# Patient Record
Sex: OTHER/UNKNOWN | Race: WHITE | URBAN - METROPOLITAN AREA
[De-identification: names, ages, dates, MRNs, and addresses within clinical notes are randomized per-mention and may not be internally consistent; named-entity substitution may affect disease eponyms.]

---

## 2017-01-25 ENCOUNTER — APPOINTMENT (OUTPATIENT)
Dept: URBAN - METROPOLITAN AREA CLINIC 290 | Age: 82
Setting detail: DERMATOLOGY
End: 2017-01-26

## 2017-01-25 DIAGNOSIS — Z12.83 ENCOUNTER FOR SCREENING FOR MALIGNANT NEOPLASM OF SKIN: ICD-10-CM

## 2017-01-25 DIAGNOSIS — L21.8 OTHER SEBORRHEIC DERMATITIS: ICD-10-CM

## 2017-01-25 DIAGNOSIS — L57.0 ACTINIC KERATOSIS: ICD-10-CM

## 2017-01-25 DIAGNOSIS — L81.4 OTHER MELANIN HYPERPIGMENTATION: ICD-10-CM

## 2017-01-25 PROBLEM — D48.5 NEOPLASM OF UNCERTAIN BEHAVIOR OF SKIN: Status: ACTIVE | Noted: 2017-01-25

## 2017-01-25 PROCEDURE — OTHER OBSERVATION: OTHER

## 2017-01-25 PROCEDURE — OTHER MIPS QUALITY: OTHER

## 2017-01-25 PROCEDURE — 99203 OFFICE O/P NEW LOW 30 MIN: CPT | Mod: 25

## 2017-01-25 PROCEDURE — OTHER DEFER: OTHER

## 2017-01-25 PROCEDURE — OTHER PRESCRIPTION: OTHER

## 2017-01-25 PROCEDURE — OTHER LIQUID NITROGEN: OTHER

## 2017-01-25 PROCEDURE — 17000 DESTRUCT PREMALG LESION: CPT

## 2017-01-25 PROCEDURE — OTHER COUNSELING: OTHER

## 2017-01-25 PROCEDURE — 17003 DESTRUCT PREMALG LES 2-14: CPT

## 2017-01-25 PROCEDURE — 11100: CPT | Mod: 59

## 2017-01-25 PROCEDURE — 69100 BIOPSY OF EXTERNAL EAR: CPT | Mod: 59

## 2017-01-25 PROCEDURE — OTHER BIOPSY BY SHAVE METHOD: OTHER

## 2017-01-25 RX ORDER — KETOCONAZOLE 20.5 MG/ML
SHAMPOO, SUSPENSION TOPICAL
Qty: 120 | Refills: 0 | Status: ERX | COMMUNITY
Start: 2017-01-25

## 2017-01-25 ASSESSMENT — LOCATION ZONE DERM
LOCATION ZONE: ARM
LOCATION ZONE: SCALP
LOCATION ZONE: FACE

## 2017-01-25 ASSESSMENT — LOCATION SIMPLE DESCRIPTION DERM
LOCATION SIMPLE: LEFT FOREARM
LOCATION SIMPLE: LEFT TEMPLE
LOCATION SIMPLE: LEFT FOREHEAD
LOCATION SIMPLE: RIGHT ZYGOMA
LOCATION SIMPLE: HAIR

## 2017-01-25 ASSESSMENT — LOCATION DETAILED DESCRIPTION DERM
LOCATION DETAILED: LEFT MEDIAL TEMPLE
LOCATION DETAILED: LEFT PROXIMAL DORSAL FOREARM
LOCATION DETAILED: LEFT FOREHEAD
LOCATION DETAILED: LEFT INFERIOR FOREHEAD
LOCATION DETAILED: HAIR
LOCATION DETAILED: RIGHT CENTRAL ZYGOMA

## 2017-01-25 NOTE — PROCEDURE: LIQUID NITROGEN
Post-Care Instructions: Importance of follow up discussed
Consent: Patient warned of the risk of swelling, blistering, and scarring. Importance of follow up discussed\\nVerbal consent obtained
Duration Of Freeze Thaw-Cycle (Seconds): 0
Detail Level: Zone
Render Post-Care Instructions In Note?: yes

## 2017-01-25 NOTE — PROCEDURE: BIOPSY BY SHAVE METHOD
Bill For Surgical Tray: no
Biopsy Method: Personna blade
Anesthesia Volume In Cc (Will Not Render If 0): 0.8
Consent: Verbal consent was obtained. Warned of risk of scaring, bleeding, infection, hypo and hyper pigmentation and possible need for further treatment.
Biopsy Type: H and E
Hemostasis: Pressure
Type Of Destruction Used: Curettage
Electrodesiccation And Curettage Text: The wound bed was treated with electrodesiccation and curettage after the biopsy was performed.
Electrodesiccation Text: The wound bed was treated with electrodesiccation after the biopsy was performed.
Anesthesia Type: 1% lidocaine with 1:500,000 epinephrine and a 1:10 solution of 8.4% sodium bicarbonate
Additional Anesthesia Volume In Cc (Will Not Render If 0): 0
Detail Level: Detailed
Silver Nitrate Text: The wound bed was treated with silver nitrate after the biopsy was performed.
Billing Type: Third-Party Bill
Dressing: waterproof dressing
Post-Care Instructions: Wash, apply Vaseline and a bandage daily until healed \\nImportance of follow up discussed
Cryotherapy Text: The wound bed was treated with cryotherapy after the biopsy was performed.
Render Post-Care Instructions In Note?: yes
Path Notes (To The Dermatopathologist): 7mm\\nCliffel
Wound Care: No ointment
Path Notes (To The Dermatopathologist): 8mm\\nCliffel
Size Of Lesion In Cm: 0.7

## 2017-02-15 ENCOUNTER — APPOINTMENT (OUTPATIENT)
Dept: URBAN - METROPOLITAN AREA CLINIC 290 | Age: 82
Setting detail: DERMATOLOGY
End: 2017-03-02

## 2017-02-15 DIAGNOSIS — L57.0 ACTINIC KERATOSIS: ICD-10-CM

## 2017-02-15 PROBLEM — C44.329 SQUAMOUS CELL CARCINOMA OF SKIN OF OTHER PARTS OF FACE: Status: ACTIVE | Noted: 2017-02-15

## 2017-02-15 PROBLEM — D48.5 NEOPLASM OF UNCERTAIN BEHAVIOR OF SKIN: Status: ACTIVE | Noted: 2017-02-15

## 2017-02-15 PROBLEM — C44.212 BASAL CELL CARCINOMA OF SKIN OF RIGHT EAR AND EXTERNAL AURICULAR CANAL: Status: ACTIVE | Noted: 2017-02-15

## 2017-02-15 PROCEDURE — OTHER COUNSELING: OTHER

## 2017-02-15 PROCEDURE — OTHER PATHOLOGY DISCUSSION: OTHER

## 2017-02-15 PROCEDURE — 17000 DESTRUCT PREMALG LESION: CPT

## 2017-02-15 PROCEDURE — OTHER DEFER: OTHER

## 2017-02-15 PROCEDURE — 11100: CPT | Mod: 59

## 2017-02-15 PROCEDURE — OTHER BIOPSY BY SHAVE METHOD: OTHER

## 2017-02-15 PROCEDURE — 11101: CPT

## 2017-02-15 PROCEDURE — 99213 OFFICE O/P EST LOW 20 MIN: CPT | Mod: 25

## 2017-02-15 PROCEDURE — OTHER LIQUID NITROGEN: OTHER

## 2017-02-15 ASSESSMENT — LOCATION DETAILED DESCRIPTION DERM: LOCATION DETAILED: LEFT PROXIMAL DORSAL FOREARM

## 2017-02-15 ASSESSMENT — LOCATION SIMPLE DESCRIPTION DERM: LOCATION SIMPLE: LEFT FOREARM

## 2017-02-15 ASSESSMENT — LOCATION ZONE DERM: LOCATION ZONE: ARM

## 2017-02-15 NOTE — PROCEDURE: DEFER
Detail Level: Detailed
Instructions (Optional): MOHS scheduled for 4/24/17 @ 7:45 am
Instructions (Optional): MOHS scheduled for 4/17/17 @ 9:00 am

## 2017-02-15 NOTE — PROCEDURE: LIQUID NITROGEN
Duration Of Freeze Thaw-Cycle (Seconds): 0
Render Post-Care Instructions In Note?: yes
Consent: Patient warned of the risk of swelling, blistering, and scarring. Importance of follow up discussed\\nVerbal consent obtained
Post-Care Instructions: Importance of follow up discussed
Detail Level: Zone

## 2017-02-15 NOTE — PROCEDURE: BIOPSY BY SHAVE METHOD
Bill For Surgical Tray: no
Detail Level: Detailed
Electrodesiccation And Curettage Text: The wound bed was treated with electrodesiccation and curettage after the biopsy was performed.
Type Of Destruction Used: Electrodesiccation
Dressing: no dressing applied
Wound Care: Vaseline
Consent: Verbal consent was obtained. Warned of risk of scaring, bleeding, infection, hypo and hyper pigmentation and possible need for further treatment.
Biopsy Method: Personna blade
Billing Type: Third-Party Bill
Hemostasis: Pressure
Post-Care Instructions: Wash, apply Vaseline and a bandage daily until healed \\nImportance of follow up discussed
Biopsy Type: H and E
X Size Of Lesion In Cm: 0
Size Of Lesion In Cm: 1.3
Silver Nitrate Text: The wound bed was treated with silver nitrate after the biopsy was performed.
Anesthesia Type: 1% lidocaine without epinephrine and a 1:10 solution of 8.4% sodium bicarbonate
Anesthesia Volume In Cc (Will Not Render If 0): 1
Render Post-Care Instructions In Note?: yes
Cryotherapy Text: The wound bed was treated with cryotherapy after the biopsy was performed.
Size Of Lesion In Cm: 0.6
Electrodesiccation Text: The wound bed was treated with electrodesiccation after the biopsy was performed.

## 2017-04-17 ENCOUNTER — APPOINTMENT (OUTPATIENT)
Dept: URBAN - METROPOLITAN AREA CLINIC 290 | Age: 82
Setting detail: DERMATOLOGY
End: 2017-04-17

## 2017-04-17 VITALS — SYSTOLIC BLOOD PRESSURE: 190 MMHG | DIASTOLIC BLOOD PRESSURE: 84 MMHG

## 2017-04-17 PROBLEM — C44.399 OTHER SPECIFIED MALIGNANT NEOPLASM OF SKIN OF OTHER PARTS OF FACE: Status: ACTIVE | Noted: 2017-04-17

## 2017-04-17 PROCEDURE — 17312 MOHS ADDL STAGE: CPT

## 2017-04-17 PROCEDURE — 13132 CMPLX RPR F/C/C/M/N/AX/G/H/F: CPT

## 2017-04-17 PROCEDURE — OTHER CONSULTATION FOR MOHS SURGERY: OTHER

## 2017-04-17 PROCEDURE — 17311 MOHS 1 STAGE H/N/HF/G: CPT

## 2017-04-17 PROCEDURE — OTHER MOHS SURGERY: OTHER

## 2017-04-17 NOTE — PROCEDURE: CONSULTATION FOR MOHS SURGERY
Incorporate Mauc In Note: No
Size Of Lesion: -
X Size Of Lesion In Cm (Optional): 0
Detail Level: Detailed

## 2017-04-24 ENCOUNTER — APPOINTMENT (OUTPATIENT)
Dept: URBAN - METROPOLITAN AREA CLINIC 290 | Age: 82
Setting detail: DERMATOLOGY
End: 2017-04-24

## 2017-04-24 VITALS — DIASTOLIC BLOOD PRESSURE: 59 MMHG | SYSTOLIC BLOOD PRESSURE: 171 MMHG

## 2017-04-24 DIAGNOSIS — Z85.828 PERSONAL HISTORY OF OTHER MALIGNANT NEOPLASM OF SKIN: ICD-10-CM

## 2017-04-24 PROBLEM — C44.212 BASAL CELL CARCINOMA OF SKIN OF RIGHT EAR AND EXTERNAL AURICULAR CANAL: Status: ACTIVE | Noted: 2017-04-24

## 2017-04-24 PROCEDURE — 17312 MOHS ADDL STAGE: CPT | Mod: 79

## 2017-04-24 PROCEDURE — OTHER MOHS SURGERY: OTHER

## 2017-04-24 PROCEDURE — 15260 FTH/GFT FR N/E/E/L 20 SQCM/<: CPT | Mod: 79

## 2017-04-24 PROCEDURE — OTHER PRESCRIPTION: OTHER

## 2017-04-24 PROCEDURE — 17311 MOHS 1 STAGE H/N/HF/G: CPT | Mod: 79

## 2017-04-24 PROCEDURE — OTHER CONSULTATION FOR MOHS SURGERY: OTHER

## 2017-04-24 RX ORDER — CEPHALEXIN 500 MG/1
CAPSULE ORAL
Qty: 21 | Refills: 0 | Status: ERX | COMMUNITY
Start: 2017-04-24

## 2017-04-24 NOTE — PROCEDURE: CONSULTATION FOR MOHS SURGERY
X Size Of Lesion In Cm (Optional): 0
Size Of Lesion: -
Incorporate Mauc In Note: No
Detail Level: Detailed

## 2017-04-28 ENCOUNTER — APPOINTMENT (OUTPATIENT)
Dept: URBAN - METROPOLITAN AREA CLINIC 290 | Age: 82
Setting detail: DERMATOLOGY
End: 2017-04-28

## 2017-04-28 DIAGNOSIS — Z48.01 ENCOUNTER FOR CHANGE OR REMOVAL OF SURGICAL WOUND DRESSING: ICD-10-CM

## 2017-04-28 PROCEDURE — OTHER DRESSING CHANGE: OTHER

## 2017-04-28 ASSESSMENT — LOCATION DETAILED DESCRIPTION DERM: LOCATION DETAILED: RIGHT SUPERIOR HELIX

## 2017-04-28 ASSESSMENT — LOCATION ZONE DERM: LOCATION ZONE: EAR

## 2017-04-28 ASSESSMENT — LOCATION SIMPLE DESCRIPTION DERM: LOCATION SIMPLE: RIGHT EAR

## 2017-04-28 NOTE — PROCEDURE: DRESSING CHANGE
Add 59135 Cpt? (Important Note: In 2017 The Use Of 88552 Is Being Tracked By Cms To Determine Future Global Period Reimbursement For Global Periods): no
Detail Level: Detailed

## 2017-05-01 ENCOUNTER — APPOINTMENT (OUTPATIENT)
Dept: URBAN - METROPOLITAN AREA CLINIC 290 | Age: 82
Setting detail: DERMATOLOGY
End: 2017-05-01

## 2017-05-01 DIAGNOSIS — Z48.02 ENCOUNTER FOR REMOVAL OF SUTURES: ICD-10-CM

## 2017-05-01 PROCEDURE — OTHER SUTURE REMOVAL (GLOBAL PERIOD): OTHER

## 2017-05-01 PROCEDURE — 99024 POSTOP FOLLOW-UP VISIT: CPT

## 2017-05-01 ASSESSMENT — LOCATION SIMPLE DESCRIPTION DERM: LOCATION SIMPLE: RIGHT EAR

## 2017-05-01 ASSESSMENT — LOCATION ZONE DERM: LOCATION ZONE: EAR

## 2017-05-01 ASSESSMENT — LOCATION DETAILED DESCRIPTION DERM: LOCATION DETAILED: RIGHT SUPERIOR HELIX

## 2017-05-01 NOTE — PROCEDURE: SUTURE REMOVAL (GLOBAL PERIOD)
Add 48807 Cpt? (Important Note: In 2017 The Use Of 31930 Is Being Tracked By Cms To Determine Future Global Period Reimbursement For Global Periods): yes
Detail Level: Detailed

## 2017-05-01 NOTE — HPI: SUTURE REMOVAL
Additional History: Patient states he tolerated procedure well.  Patient states he has been keeping Vaseline and a bandage on site daily.

## 2017-07-05 ENCOUNTER — APPOINTMENT (OUTPATIENT)
Dept: URBAN - METROPOLITAN AREA CLINIC 290 | Age: 82
Setting detail: DERMATOLOGY
End: 2017-07-05

## 2017-07-05 DIAGNOSIS — Z12.83 ENCOUNTER FOR SCREENING FOR MALIGNANT NEOPLASM OF SKIN: ICD-10-CM

## 2017-07-05 DIAGNOSIS — L57.0 ACTINIC KERATOSIS: ICD-10-CM

## 2017-07-05 DIAGNOSIS — Z85.828 PERSONAL HISTORY OF OTHER MALIGNANT NEOPLASM OF SKIN: ICD-10-CM

## 2017-07-05 PROBLEM — D48.5 NEOPLASM OF UNCERTAIN BEHAVIOR OF SKIN: Status: ACTIVE | Noted: 2017-07-05

## 2017-07-05 PROCEDURE — OTHER DEFER: OTHER

## 2017-07-05 PROCEDURE — OTHER COUNSELING: OTHER

## 2017-07-05 PROCEDURE — 17000 DESTRUCT PREMALG LESION: CPT | Mod: 79

## 2017-07-05 PROCEDURE — OTHER LIQUID NITROGEN: OTHER

## 2017-07-05 PROCEDURE — 11100: CPT | Mod: 59,79

## 2017-07-05 PROCEDURE — 17003 DESTRUCT PREMALG LES 2-14: CPT | Mod: 79

## 2017-07-05 PROCEDURE — 99214 OFFICE O/P EST MOD 30 MIN: CPT | Mod: 25,24

## 2017-07-05 PROCEDURE — OTHER BIOPSY BY SHAVE METHOD: OTHER

## 2017-07-05 PROCEDURE — OTHER MIPS QUALITY: OTHER

## 2017-07-05 ASSESSMENT — LOCATION DETAILED DESCRIPTION DERM
LOCATION DETAILED: RIGHT SUPERIOR OCCIPITAL SCALP
LOCATION DETAILED: LEFT SUPERIOR OCCIPITAL SCALP
LOCATION DETAILED: LEFT MID TEMPLE
LOCATION DETAILED: LEFT POSTERIOR EAR
LOCATION DETAILED: LEFT INFERIOR FOREHEAD
LOCATION DETAILED: MID-OCCIPITAL SCALP
LOCATION DETAILED: LEFT LATERAL FOREHEAD
LOCATION DETAILED: RIGHT ANTERIOR EARLOBE
LOCATION DETAILED: RIGHT INFERIOR FOREHEAD
LOCATION DETAILED: LEFT CENTRAL MALAR CHEEK
LOCATION DETAILED: RIGHT SUPERIOR POSTERIOR HELIX

## 2017-07-05 ASSESSMENT — LOCATION ZONE DERM
LOCATION ZONE: FACE
LOCATION ZONE: SCALP
LOCATION ZONE: EAR

## 2017-07-05 ASSESSMENT — LOCATION SIMPLE DESCRIPTION DERM
LOCATION SIMPLE: POSTERIOR SCALP
LOCATION SIMPLE: LEFT FOREHEAD
LOCATION SIMPLE: LEFT CHEEK
LOCATION SIMPLE: LEFT TEMPLE
LOCATION SIMPLE: RIGHT FOREHEAD
LOCATION SIMPLE: RIGHT EAR
LOCATION SIMPLE: LEFT EAR

## 2017-07-05 NOTE — PROCEDURE: DEFER
Other Procedure: ED&C
Detail Level: Detailed
Instructions (Optional): 7mm plan for biopsy with destruction in 2 weeks
Procedure To Be Performed At Next Visit: Biopsy by shave method

## 2017-07-05 NOTE — PROCEDURE: BIOPSY BY SHAVE METHOD
Post-Care Instructions: Wash, apply Vaseline and a bandage daily until healed \\nImportance of follow up discussed
X Size Of Lesion In Cm: 0
Bill For Surgical Tray: no
Cryotherapy Text: The wound bed was treated with cryotherapy after the biopsy was performed.
Electrodesiccation Text: The wound bed was treated with electrodesiccation after the biopsy was performed.
Hemostasis: Aluminum Chloride and Electrocautery
Path Notes (To The Dermatopathologist): 5mm\\nDr. C
Detail Level: Detailed
Wound Care: No ointment
Electrodesiccation And Curettage Text: The wound bed was treated with electrodesiccation and curettage after the biopsy was performed.
Anesthesia Type: 1% lidocaine with 1:100,000 epinephrine and a 1:6 solution of 8.4% sodium bicarbonate
Billing Type: Third-Party Bill
Biopsy Type: H and E
Size Of Lesion In Cm: 0.5
Render Post-Care Instructions In Note?: yes
Silver Nitrate Text: The wound bed was treated with silver nitrate after the biopsy was performed.
Type Of Destruction Used: Electrodesiccation
Anesthesia Volume In Cc (Will Not Render If 0): 0.9
Consent: Verbal consent was obtained. Warned of risk of scaring, bleeding, infection, hypo and hyper pigmentation and possible need for further treatment.
Biopsy Method: Personna blade
Dressing: waterproof dressing

## 2017-07-05 NOTE — PROCEDURE: LIQUID NITROGEN
Number Of Freeze-Thaw Cycles: 1 freeze-thaw cycle
Render Post-Care Instructions In Note?: yes
Consent: Patient warned of the risk of swelling, blistering, and scarring. Importance of follow up discussed\\nVerbal consent obtained
Detail Level: Zone
Duration Of Freeze Thaw-Cycle (Seconds): 0
Post-Care Instructions: Importance of follow up discussed

## 2017-07-19 ENCOUNTER — APPOINTMENT (OUTPATIENT)
Dept: URBAN - METROPOLITAN AREA CLINIC 290 | Age: 82
Setting detail: DERMATOLOGY
End: 2017-09-19

## 2017-07-19 PROBLEM — C44.311 BASAL CELL CARCINOMA OF SKIN OF NOSE: Status: ACTIVE | Noted: 2017-07-19

## 2017-07-19 PROBLEM — D48.5 NEOPLASM OF UNCERTAIN BEHAVIOR OF SKIN: Status: ACTIVE | Noted: 2017-07-19

## 2017-07-19 PROCEDURE — 11100: CPT | Mod: 79,59

## 2017-07-19 PROCEDURE — 99213 OFFICE O/P EST LOW 20 MIN: CPT | Mod: 25,24

## 2017-07-19 PROCEDURE — OTHER PATHOLOGY DISCUSSION: OTHER

## 2017-07-19 PROCEDURE — OTHER BIOPSY BY SHAVE METHOD: OTHER

## 2017-07-19 PROCEDURE — OTHER COUNSELING: OTHER

## 2017-07-19 PROCEDURE — OTHER CURETTAGE AND DESTRUCTION: OTHER

## 2017-07-19 PROCEDURE — OTHER DEFER: OTHER

## 2017-07-19 PROCEDURE — 17260 DSTRJ MAL LES T/A/L 0.5 CM/<: CPT | Mod: 79

## 2017-07-19 NOTE — PROCEDURE: CURETTAGE AND DESTRUCTION
Anesthesia Type: sterile water
Bill For Surgical Tray: no
Bill As A Line Item Or As Units: Line Item
Consent: Verbal Consent was obtained from the patient. The risks, benefits and alternatives to therapy were discussed in detail. Specifically, the risks of infection, scarring, bleeding, prolonged wound healing, nerve injury, incomplete removal, allergy to anesthesia and recurrence were addressed. Alternatives to ED&C, such as: surgical removal and XRT were also discussed.  Prior to the procedure, the treatment site was clearly identified and confirmed by the patient.
Cautery Type: aluminum chloride and electrodesiccation
Post-Care Instructions: I reviewed with the patient in detail post-care instructions. Patient is to keep the area dry for 48 hours, and not to engage in any swimming until the area is healed. Should the patient develop any fevers, chills, bleeding, severe pain patient will contact the office immediately. Patient should cover area with Vaseline and a bandage after washing with soap and water daily.
Render Post-Care Instructions In Note?: yes
Size Of Lesion In Cm: 0.7
Size Of Lesion After Curettage: 0
Detail Level: Detailed
What Was Performed First?: Curettage
Anesthesia Volume In Cc: 1.5
Number Of Curettages: 3
Additional Information: (Optional): The wound was cleaned, and a dressing was applied.  The patient received detailed post-op instructions.

## 2017-07-19 NOTE — PROCEDURE: BIOPSY BY SHAVE METHOD
Billing Type: Third-Party Bill
Hemostasis: Aluminum Chloride and Electrocautery
Biopsy Method: Personna blade
Electrodesiccation And Curettage Text: The wound bed was treated with electrodesiccation and curettage after the biopsy was performed.
Anesthesia Type: 1% lidocaine with 1:100,000 epinephrine and a 1:10 solution of 8.4% sodium bicarbonate
Render Post-Care Instructions In Note?: yes
Size Of Lesion In Cm: 0.7
Consent: Verbal consent was obtained. Warned of risk of scaring, bleeding, infection, hypo and hyper pigmentation and possible need for further treatment.
Additional Anesthesia Volume In Cc (Will Not Render If 0): 0
Post-Care Instructions: Wash, apply Vaseline and a bandage daily until healed \\nImportance of follow up discussed
Biopsy Type: H and E
Anesthesia Volume In Cc (Will Not Render If 0): 0.5
Bill 45353 For Specimen Handling/Conveyance To Laboratory?: no
Dressing: waterproof dressing
Detail Level: Detailed
Wound Care: No ointment
Path Notes (To The Dermatopathologist): 7mm\\nCliffel
Cryotherapy Text: The wound bed was treated with cryotherapy after the biopsy was performed.
Silver Nitrate Text: The wound bed was treated with silver nitrate after the biopsy was performed.
Type Of Destruction Used: Electrodesiccation
Electrodesiccation Text: The wound bed was treated with electrodesiccation after the biopsy was performed.

## 2017-08-21 ENCOUNTER — APPOINTMENT (OUTPATIENT)
Dept: URBAN - METROPOLITAN AREA CLINIC 290 | Age: 82
Setting detail: DERMATOLOGY
End: 2017-10-13

## 2017-08-21 DIAGNOSIS — Z85.828 PERSONAL HISTORY OF OTHER MALIGNANT NEOPLASM OF SKIN: ICD-10-CM

## 2017-08-21 PROCEDURE — OTHER COUNSELING: OTHER

## 2017-08-21 PROCEDURE — 99212 OFFICE O/P EST SF 10 MIN: CPT

## 2017-08-21 ASSESSMENT — LOCATION DETAILED DESCRIPTION DERM: LOCATION DETAILED: LEFT DISTAL DORSAL FOREARM

## 2017-08-21 ASSESSMENT — LOCATION ZONE DERM: LOCATION ZONE: ARM

## 2017-08-21 ASSESSMENT — LOCATION SIMPLE DESCRIPTION DERM: LOCATION SIMPLE: LEFT FOREARM

## 2017-09-18 ENCOUNTER — APPOINTMENT (OUTPATIENT)
Dept: URBAN - METROPOLITAN AREA CLINIC 290 | Age: 82
Setting detail: DERMATOLOGY
End: 2017-09-18

## 2017-09-18 VITALS — SYSTOLIC BLOOD PRESSURE: 138 MMHG | DIASTOLIC BLOOD PRESSURE: 61 MMHG

## 2017-09-18 PROBLEM — C44.311 BASAL CELL CARCINOMA OF SKIN OF NOSE: Status: ACTIVE | Noted: 2017-09-18

## 2017-09-18 PROCEDURE — OTHER PRESCRIPTION: OTHER

## 2017-09-18 PROCEDURE — OTHER MOHS SURGERY: OTHER

## 2017-09-18 PROCEDURE — 17311 MOHS 1 STAGE H/N/HF/G: CPT

## 2017-09-18 PROCEDURE — 14060 TIS TRNFR E/N/E/L 10 SQ CM/<: CPT

## 2017-09-18 PROCEDURE — OTHER CONSULTATION FOR MOHS SURGERY: OTHER

## 2017-09-18 PROCEDURE — 17312 MOHS ADDL STAGE: CPT

## 2017-09-18 RX ORDER — CEPHALEXIN 500 MG/1
CAPSULE ORAL
Qty: 21 | Refills: 0 | Status: ERX

## 2017-09-18 NOTE — PROCEDURE: CONSULTATION FOR MOHS SURGERY
X Size Of Lesion In Cm (Optional): 0
Incorporate Mauc In Note: No
Size Of Lesion: -
Detail Level: Detailed

## 2017-09-25 ENCOUNTER — APPOINTMENT (OUTPATIENT)
Dept: URBAN - METROPOLITAN AREA CLINIC 290 | Age: 82
Setting detail: DERMATOLOGY
End: 2017-09-25

## 2017-09-25 DIAGNOSIS — Z48.02 ENCOUNTER FOR REMOVAL OF SUTURES: ICD-10-CM

## 2017-09-25 DIAGNOSIS — Z48.817 ENCOUNTER FOR SURGICAL AFTERCARE FOLLOWING SURGERY ON THE SKIN AND SUBCUTANEOUS TISSUE: ICD-10-CM

## 2017-09-25 PROCEDURE — OTHER SUTURE REMOVAL (GLOBAL PERIOD): OTHER

## 2017-09-25 PROCEDURE — OTHER POST-OP WOUND EVALUATION: OTHER

## 2017-09-25 ASSESSMENT — LOCATION SIMPLE DESCRIPTION DERM: LOCATION SIMPLE: NOSE

## 2017-09-25 ASSESSMENT — LOCATION DETAILED DESCRIPTION DERM: LOCATION DETAILED: NASAL TIP

## 2017-09-25 ASSESSMENT — LOCATION ZONE DERM: LOCATION ZONE: NOSE

## 2017-09-25 NOTE — PROCEDURE: SUTURE REMOVAL (GLOBAL PERIOD)
Add 37126 Cpt? (Important Note: In 2017 The Use Of 91677 Is Being Tracked By Cms To Determine Future Global Period Reimbursement For Global Periods): no
Detail Level: Detailed

## 2017-09-25 NOTE — PROCEDURE: POST-OP WOUND EVALUATION
Wound Crusting?: crusted
Add 33467 Cpt? (Important Note: In 2017 The Use Of 00413 Is Being Tracked By Cms To Determine Future Global Period Reimbursement For Global Periods): no
Follow Up Units (Optional): 0
Wound Evaluated By (Optional): Dr kopitzki
Sutures?: intact
Detail Level: Detailed

## 2017-10-09 ENCOUNTER — APPOINTMENT (OUTPATIENT)
Dept: URBAN - METROPOLITAN AREA CLINIC 290 | Age: 82
Setting detail: DERMATOLOGY
End: 2017-10-09

## 2017-10-09 DIAGNOSIS — Z48.817 ENCOUNTER FOR SURGICAL AFTERCARE FOLLOWING SURGERY ON THE SKIN AND SUBCUTANEOUS TISSUE: ICD-10-CM

## 2017-10-09 DIAGNOSIS — L57.0 ACTINIC KERATOSIS: ICD-10-CM

## 2017-10-09 PROBLEM — D48.5 NEOPLASM OF UNCERTAIN BEHAVIOR OF SKIN: Status: ACTIVE | Noted: 2017-10-09

## 2017-10-09 PROCEDURE — OTHER POST-OP WOUND EVALUATION: OTHER

## 2017-10-09 PROCEDURE — 11100: CPT | Mod: 79

## 2017-10-09 PROCEDURE — OTHER BIOPSY BY SHAVE METHOD: OTHER

## 2017-10-09 ASSESSMENT — LOCATION DETAILED DESCRIPTION DERM
LOCATION DETAILED: LEFT RADIAL DORSAL HAND
LOCATION DETAILED: NASAL INFRATIP

## 2017-10-09 ASSESSMENT — LOCATION ZONE DERM
LOCATION ZONE: HAND
LOCATION ZONE: NOSE

## 2017-10-09 ASSESSMENT — LOCATION SIMPLE DESCRIPTION DERM
LOCATION SIMPLE: NOSE
LOCATION SIMPLE: LEFT HAND

## 2017-10-09 NOTE — PROCEDURE: POST-OP WOUND EVALUATION
Wound Crusting?: crusted
Add 45712 Cpt? (Important Note: In 2017 The Use Of 62649 Is Being Tracked By Cms To Determine Future Global Period Reimbursement For Global Periods): no
Wound Evaluated By (Optional): Dr kopitzki
Wound Diameter In Cm(Optional): 0
Detail Level: Simple
Wound Color?: red

## 2017-10-09 NOTE — PROCEDURE: BIOPSY BY SHAVE METHOD
Type Of Destruction Used: Curettage
Additional Anesthesia Volume In Cc (Will Not Render If 0): 0
Anesthesia Type: 1% lidocaine with epinephrine and a 1:10 solution of 8.4% sodium bicarbonate
Anesthesia Volume In Cc (Will Not Render If 0): 0.6
Dressing: bandage
Biopsy Type: H and E
Bill For Surgical Tray: no
Cryotherapy Text: The wound bed was treated with cryotherapy after the biopsy was performed.
Biopsy Method: Dermablade
Silver Nitrate Text: The wound bed was treated with silver nitrate after the biopsy was performed.
Hemostasis: Electrocautery
Electrodesiccation And Curettage Text: The wound bed was treated with electrodesiccation and curettage after the biopsy was performed.
Billing Type: Third-Party Bill
Post-Care Instructions: I reviewed with the patient in detail post-care instructions. Patient is to keep the biopsy site dry overnight, and then apply bacitracin twice daily until healed.
Path Notes (To The Dermatopathologist): 11 mm\\nKopitzki
Electrodesiccation Text: The wound bed was treated with electrodesiccation after the biopsy was performed.
Wound Care: Aquaphor
Notification Instructions: Patient will be notified of biopsy results. However, patient instructed to call the office if not contacted within 2 weeks.
Size Of Lesion In Cm: 1.1
Consent: Written consent was obtained and risks were reviewed including but not limited to scarring, infection, bleeding, scabbing, incomplete removal, nerve damage and allergy to anesthesia.
Detail Level: Detailed

## 2017-10-27 ENCOUNTER — RX ONLY (RX ONLY)
Age: 82
End: 2017-10-27

## 2017-10-27 RX ORDER — FLUOROURACIL 50 MG/G
CREAM TOPICAL
Qty: 40 | Refills: 0 | Status: ERX | COMMUNITY
Start: 2017-10-27

## 2017-11-13 ENCOUNTER — APPOINTMENT (OUTPATIENT)
Dept: URBAN - METROPOLITAN AREA CLINIC 290 | Age: 82
Setting detail: DERMATOLOGY
End: 2017-11-13

## 2017-11-13 DIAGNOSIS — L57.0 ACTINIC KERATOSIS: ICD-10-CM

## 2017-11-13 DIAGNOSIS — Z48.817 ENCOUNTER FOR SURGICAL AFTERCARE FOLLOWING SURGERY ON THE SKIN AND SUBCUTANEOUS TISSUE: ICD-10-CM

## 2017-11-13 PROCEDURE — OTHER DEFER: OTHER

## 2017-11-13 PROCEDURE — OTHER OTHER: OTHER

## 2017-11-13 PROCEDURE — OTHER POST-OP WOUND EVALUATION: OTHER

## 2017-11-13 ASSESSMENT — LOCATION SIMPLE DESCRIPTION DERM
LOCATION SIMPLE: NOSE
LOCATION SIMPLE: LEFT HAND

## 2017-11-13 ASSESSMENT — LOCATION DETAILED DESCRIPTION DERM
LOCATION DETAILED: LEFT DORSAL INDEX METACARPOPHALANGEAL JOINT
LOCATION DETAILED: NASAL SUPRATIP

## 2017-11-13 ASSESSMENT — LOCATION ZONE DERM
LOCATION ZONE: NOSE
LOCATION ZONE: HAND

## 2017-11-13 NOTE — PROCEDURE: OTHER
Note Text (......Xxx Chief Complaint.): This diagnosis correlates with the
Detail Level: Detailed
Other (Free Text): Continue fluorouracil as directed

## 2017-11-13 NOTE — PROCEDURE: POST-OP WOUND EVALUATION
Wound Crusting?: clean
Follow Up Units (Optional): 0
Add 91402 Cpt? (Important Note: In 2017 The Use Of 93519 Is Being Tracked By Cms To Determine Future Global Period Reimbursement For Global Periods): no
Detail Level: Detailed
Patient To Follow-Up With?: our clinic
Wound Evaluated By (Optional): Dr kopitzki
Wound Granulation?: early

## 2017-11-20 ENCOUNTER — APPOINTMENT (OUTPATIENT)
Dept: URBAN - METROPOLITAN AREA CLINIC 290 | Age: 82
Setting detail: DERMATOLOGY
End: 2017-11-20

## 2017-11-20 DIAGNOSIS — Z48.817 ENCOUNTER FOR SURGICAL AFTERCARE FOLLOWING SURGERY ON THE SKIN AND SUBCUTANEOUS TISSUE: ICD-10-CM

## 2017-11-20 PROCEDURE — OTHER OTHER: OTHER

## 2017-11-20 ASSESSMENT — LOCATION ZONE DERM: LOCATION ZONE: NOSE

## 2017-11-20 ASSESSMENT — LOCATION SIMPLE DESCRIPTION DERM: LOCATION SIMPLE: NOSE

## 2017-11-20 ASSESSMENT — LOCATION DETAILED DESCRIPTION DERM: LOCATION DETAILED: NASAL TIP

## 2017-11-20 NOTE — PROCEDURE: OTHER
Detail Level: Detailed
Note Text (......Xxx Chief Complaint.): This diagnosis correlates with the
Other (Free Text): Dermabrasion,,,informed consent taken, warned against pain, infection, oozing, redness, possible need for additional contouring... cleaned prepped and draped in usual fashion, anesthesia with 1%lido with epi, area manually dermabraded with sterile course and then fine grit paper to pinpoint bleeding and feathered at the edges,, anesthesia with spot cautery,,, liberal ointment for next week and pressure dressing applied today.

## 2017-12-18 ENCOUNTER — APPOINTMENT (OUTPATIENT)
Dept: URBAN - METROPOLITAN AREA CLINIC 290 | Age: 82
Setting detail: DERMATOLOGY
End: 2017-12-18

## 2017-12-18 DIAGNOSIS — Z48.817 ENCOUNTER FOR SURGICAL AFTERCARE FOLLOWING SURGERY ON THE SKIN AND SUBCUTANEOUS TISSUE: ICD-10-CM

## 2017-12-18 PROCEDURE — OTHER POST-OP WOUND EVALUATION: OTHER

## 2017-12-18 PROCEDURE — OTHER INTRALESIONAL KENALOG: OTHER

## 2017-12-18 ASSESSMENT — LOCATION DETAILED DESCRIPTION DERM: LOCATION DETAILED: NASAL TIP

## 2017-12-18 ASSESSMENT — LOCATION SIMPLE DESCRIPTION DERM: LOCATION SIMPLE: NOSE

## 2017-12-18 ASSESSMENT — LOCATION ZONE DERM: LOCATION ZONE: NOSE

## 2017-12-18 NOTE — PROCEDURE: INTRALESIONAL KENALOG
Medical Necessity Clause: This procedure was medically necessary because the lesions that were treated were itchy and tender
Total Volume Injected (Ccs- Only Use Numbers And Decimals): 0.1
Administered By (Optional): Dr. Kopitzki
Kenalog Preparation: Kenalog
Consent: The risks of atrophy were reviewed with the patient.
X Size Of Lesion In Cm (Optional): 0
Concentration Of Solution Injected (Mg/Ml): 5.0
Detail Level: Zone
Include Z78.9 (Other Specified Conditions Influencing Health Status) As An Associated Diagnosis?: No

## 2017-12-18 NOTE — HPI: POST-OP VISIT
Is This A New Presentation, Or A Follow-Up?: Wound Check
Body Location Override (Optional): Nasal tip.
Date Of Procedure: November 20, 2017.

## 2018-04-28 NOTE — PROCEDURE: POST-OP WOUND EVALUATION
[facility], [place], Emergency Department    [address] [place] [id number]    Phone:  [phone]                                       Luc Hebert   MRN: [id number]    Department:  [facility], Emergency Department   Date of Visit:  4/28/2018           After Visit Summary Signature Page     I have received my discharge instructions, and my questions have been answered. I have discussed any challenges I see with this plan with the nurse or doctor.    ..........................................................................................................................................  Patient/Patient Representative Signature      ..........................................................................................................................................  Patient Representative Print Name and Relationship to Patient    ..................................................               ................................................  Date                                            Time    ..........................................................................................................................................  Reviewed by Signature/Title    ...................................................              ..............................................  Date                                                            Time          
Follow Up Units (Optional): 0
Detail Level: Detailed
Wound Evaluated By (Optional): Dr kopitzki
Add 78889 Cpt? (Important Note: In 2017 The Use Of 10702 Is Being Tracked By Cms To Determine Future Global Period Reimbursement For Global Periods): no

## 2018-06-04 ENCOUNTER — APPOINTMENT (OUTPATIENT)
Dept: URBAN - METROPOLITAN AREA CLINIC 290 | Age: 83
Setting detail: DERMATOLOGY
End: 2018-06-04

## 2018-06-04 DIAGNOSIS — L21.8 OTHER SEBORRHEIC DERMATITIS: ICD-10-CM

## 2018-06-04 PROCEDURE — OTHER COUNSELING: OTHER

## 2018-06-04 PROCEDURE — 99213 OFFICE O/P EST LOW 20 MIN: CPT

## 2018-06-04 PROCEDURE — OTHER TREATMENT REGIMEN: OTHER

## 2018-06-04 PROCEDURE — OTHER PRESCRIPTION: OTHER

## 2018-06-04 RX ORDER — HYDROCORTISONE 25 MG/G
CREAM TOPICAL
Qty: 30 | Refills: 0 | Status: ERX | COMMUNITY
Start: 2018-06-04

## 2018-06-04 RX ORDER — KETOCONAZOLE 20 MG/G
CREAM TOPICAL
Qty: 30 | Refills: 0 | Status: ERX | COMMUNITY
Start: 2018-06-04

## 2018-06-04 ASSESSMENT — LOCATION DETAILED DESCRIPTION DERM: LOCATION DETAILED: LEFT SUPERIOR MEDIAL FOREHEAD

## 2018-06-04 ASSESSMENT — LOCATION ZONE DERM: LOCATION ZONE: FACE

## 2018-06-04 ASSESSMENT — LOCATION SIMPLE DESCRIPTION DERM: LOCATION SIMPLE: LEFT FOREHEAD

## 2018-08-25 NOTE — PROCEDURE: MOHS SURGERY
Hemostasis: Electrocautery C/w thiamine/MVI/Folic acid.    #Goals of care discussion  - palliative on board, f/u recs

## 2018-08-28 NOTE — PROCEDURE: MOHS SURGERY
CON: ao x 3, HENMT: clear oropharynx, soft neck, HEAD: atraumatic, SKIN: no rash, MSK: no deformities, tenderness over AC joint, able to touch opposite shoulder, full ROM of R shoulder but pain during flexion, no clavicle deformity, median/ulnar nerves intact ,NEURO: no gross motor or sensory deficit Unna Boot Text: An Unna boot was placed to help immobilize the limb and facilitate more rapid healing.

## 2018-10-10 ENCOUNTER — APPOINTMENT (OUTPATIENT)
Dept: URBAN - METROPOLITAN AREA CLINIC 290 | Age: 83
Setting detail: DERMATOLOGY
End: 2018-10-11

## 2018-10-10 DIAGNOSIS — L57.0 ACTINIC KERATOSIS: ICD-10-CM

## 2018-10-10 DIAGNOSIS — L82.1 OTHER SEBORRHEIC KERATOSIS: ICD-10-CM

## 2018-10-10 DIAGNOSIS — D22 MELANOCYTIC NEVI: ICD-10-CM

## 2018-10-10 DIAGNOSIS — Z12.83 ENCOUNTER FOR SCREENING FOR MALIGNANT NEOPLASM OF SKIN: ICD-10-CM

## 2018-10-10 PROBLEM — D48.5 NEOPLASM OF UNCERTAIN BEHAVIOR OF SKIN: Status: ACTIVE | Noted: 2018-10-10

## 2018-10-10 PROBLEM — D22.9 MELANOCYTIC NEVI, UNSPECIFIED: Status: ACTIVE | Noted: 2018-10-10

## 2018-10-10 PROCEDURE — OTHER LIQUID NITROGEN: OTHER

## 2018-10-10 PROCEDURE — 11100: CPT | Mod: 59

## 2018-10-10 PROCEDURE — OTHER COUNSELING: OTHER

## 2018-10-10 PROCEDURE — OTHER MIPS QUALITY: OTHER

## 2018-10-10 PROCEDURE — 17000 DESTRUCT PREMALG LESION: CPT

## 2018-10-10 PROCEDURE — OTHER BIOPSY BY SHAVE METHOD: OTHER

## 2018-10-10 PROCEDURE — 99214 OFFICE O/P EST MOD 30 MIN: CPT | Mod: 25

## 2018-10-10 ASSESSMENT — LOCATION DETAILED DESCRIPTION DERM
LOCATION DETAILED: RIGHT SUPERIOR MEDIAL MIDBACK
LOCATION DETAILED: EPIGASTRIC SKIN
LOCATION DETAILED: LEFT LATERAL MALAR CHEEK

## 2018-10-10 ASSESSMENT — LOCATION ZONE DERM
LOCATION ZONE: FACE
LOCATION ZONE: TRUNK

## 2018-10-10 ASSESSMENT — LOCATION SIMPLE DESCRIPTION DERM
LOCATION SIMPLE: ABDOMEN
LOCATION SIMPLE: LEFT CHEEK
LOCATION SIMPLE: RIGHT LOWER BACK

## 2018-10-10 NOTE — PROCEDURE: BIOPSY BY SHAVE METHOD
Bill 91247 For Specimen Handling/Conveyance To Laboratory?: no
Biopsy Type: H and E
Size Of Lesion In Cm: 0.8
X Size Of Lesion In Cm: 0
Post-Care Instructions: I reviewed with the patient in detail post-care instructions. Patient is to keep the biopsy site dry overnight, and then apply aquaphor twice daily until healed.
Wound Care: No ointment
Depth Of Biopsy: dermis
Electrodesiccation Text: The wound bed was treated with electrodesiccation after the biopsy was performed.
Detail Level: Detailed
Dressing: waterproof dressing
Was A Bandage Applied: Yes
Hemostasis: Pressure
Anesthesia Volume In Cc (Will Not Render If 0): 1
Electrodesiccation And Curettage Text: The wound bed was treated with electrodesiccation and curettage after the biopsy was performed.
Anesthesia Type: 1% lidocaine with 1:100,000 epinephrine and a 1:6 solution of 8.4% sodium bicarbonate
Consent: Verbal consent was obtained and risks were reviewed including but not limited to scarring, infection, bleeding, scabbing, incomplete removal, nerve damage and allergy to anesthesia.
Type Of Destruction Used: Curettage
Cryotherapy Text: The wound bed was treated with cryotherapy after the biopsy was performed.
Billing Type: Third-Party Bill
Silver Nitrate Text: The wound bed was treated with silver nitrate after the biopsy was performed.
Biopsy Method: Dermablade

## 2021-05-13 NOTE — PROCEDURE: MOHS SURGERY
na Skin Substitute Text: The defect edges were debeveled with a #15 scalpel blade.  Given the location of the defect, shape of the defect and the proximity to free margins a skin substitute graft was deemed most appropriate.  The graft material was trimmed to fit the size of the defect. The graft was then placed in the primary defect and oriented appropriately.

## 2022-04-20 NOTE — PROCEDURE: MOHS SURGERY
Rapid Mohs Report (Note: If The Tumor Is Complex, Or If Any Stage Is Divided Into More Than 5 Pieces Or If You Want A More Detailed Report, Select No And Proceed To The Individual Stages Below): no Cephalexin Counseling: I counseled the patient regarding use of cephalexin as an antibiotic for prophylactic and/or therapeutic purposes. Cephalexin (commonly prescribed under brand name Keflex) is a cephalosporin antibiotic which is active against numerous classes of bacteria, including most skin bacteria. Side effects may include nausea, diarrhea, gastrointestinal upset, rash, hives, yeast infections, and in rare cases, hepatitis, kidney disease, seizures, fever, confusion, neurologic symptoms, and others. Patients with severe allergies to penicillin medications are cautioned that there is about a 10% incidence of cross-reactivity with cephalosporins. When possible, patients with penicillin allergies should use alternatives to cephalosporins for antibiotic therapy.

## 2022-12-28 NOTE — PROCEDURE: MOHS SURGERY
Problem: At Risk for Falls  Goal: # Patient does not fall  Outcome: Outcome Not Met, Continue to Monitor  Goal: # Takes action to control fall-related risks  Outcome: Outcome Not Met, Continue to Monitor  Goal: # Verbalizes understanding of fall risk/precautions  Description: Document education using the patient education activity  Outcome: Outcome Not Met, Continue to Monitor     Problem: Pain  Goal: #Acceptable pain level achieved/maintained at rest using NRS/Faces  Description: This goal is used for patients who can self-report.  Acceptable means the level is at or below the identified comfort/function goal.  Outcome: Outcome Not Met, Continue to Monitor  Goal: # Acceptable pain level achieved/maintained with activity using NRS/Faces  Description: This goal is used for patients who can self-report and are not achieving acceptable pain control during activity.  Outcome: Outcome Not Met, Continue to Monitor  Goal: # Verbalizes understanding of pain management  Description: Documented in Patient Education Activity  Outcome: Outcome Not Met, Continue to Monitor     Problem: Nausea/Vomiting  Goal: Maintains oral intake with decreased/no reports of nausea/vomiting  Outcome: Outcome Not Met, Continue to Monitor  Goal: Current weight maintained or increased  Outcome: Outcome Not Met, Continue to Monitor  Goal: Verbalizes understanding of s/s and strategies to control nausea/vomiting  Description: Document education using the patient education activity.   Outcome: Outcome Not Met, Continue to Monitor      Unna Boot Text: An Unna boot was placed to help immobilize the limb and facilitate more rapid healing.
